# Patient Record
Sex: MALE | Race: WHITE | NOT HISPANIC OR LATINO | URBAN - METROPOLITAN AREA
[De-identification: names, ages, dates, MRNs, and addresses within clinical notes are randomized per-mention and may not be internally consistent; named-entity substitution may affect disease eponyms.]

---
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
## 2018-06-11 ENCOUNTER — EMERGENCY (EMERGENCY)
Facility: HOSPITAL | Age: 18
LOS: 1 days | Discharge: HOME | End: 2018-06-11
Attending: EMERGENCY MEDICINE | Admitting: EMERGENCY MEDICINE
Payer: COMMERCIAL

## 2018-06-11 VITALS
HEART RATE: 68 BPM | SYSTOLIC BLOOD PRESSURE: 127 MMHG | DIASTOLIC BLOOD PRESSURE: 73 MMHG | RESPIRATION RATE: 16 BRPM | TEMPERATURE: 98 F | OXYGEN SATURATION: 100 %

## 2018-06-11 PROCEDURE — 99283 EMERGENCY DEPT VISIT LOW MDM: CPT

## 2018-06-11 RX ORDER — DOLUTEGRAVIR SODIUM 25 MG/1
50 TABLET, FILM COATED ORAL ONCE
Qty: 0 | Refills: 0 | Status: COMPLETED | OUTPATIENT
Start: 2018-06-11 | End: 2018-06-11

## 2018-06-11 RX ORDER — EMTRICITABINE AND TENOFOVIR DISOPROXIL FUMARATE 200; 300 MG/1; MG/1
1 TABLET, FILM COATED ORAL
Qty: 30 | Refills: 0 | OUTPATIENT
Start: 2018-06-11 | End: 2018-07-10

## 2018-06-11 RX ORDER — ONDANSETRON 8 MG/1
1 TABLET, FILM COATED ORAL
Qty: 15 | Refills: 0 | OUTPATIENT
Start: 2018-06-11 | End: 2018-06-15

## 2018-06-11 RX ORDER — DOLUTEGRAVIR SODIUM 25 MG/1
1 TABLET, FILM COATED ORAL
Qty: 30 | Refills: 0 | OUTPATIENT
Start: 2018-06-11 | End: 2018-07-10

## 2018-06-11 RX ORDER — EMTRICITABINE AND TENOFOVIR DISOPROXIL FUMARATE 200; 300 MG/1; MG/1
1 TABLET, FILM COATED ORAL ONCE
Qty: 0 | Refills: 0 | Status: COMPLETED | OUTPATIENT
Start: 2018-06-11 | End: 2018-06-11

## 2018-06-11 RX ADMIN — EMTRICITABINE AND TENOFOVIR DISOPROXIL FUMARATE 1 TABLET(S): 200; 300 TABLET, FILM COATED ORAL at 20:20

## 2018-06-11 RX ADMIN — DOLUTEGRAVIR SODIUM 50 MILLIGRAM(S): 25 TABLET, FILM COATED ORAL at 20:20

## 2018-06-11 NOTE — ED PROVIDER NOTE - PROGRESS NOTE DETAILS
ED Attending Dr. Gómez: gave pt first dose of currently-recommended PEP, sent remaining Rx to pharmacy, with Rx Zofran in case nausea develops; discussed importance of finishing course of PEP and discussed safe sex with pt; advised pt to follow up with PMD within 10 days to consider baseline labs (i.e. LFTs) due to pt declining labs in ED

## 2018-06-11 NOTE — ED PROVIDER NOTE - OBJECTIVE STATEMENT
18y M presents for prescription for PrEP and PEP today. Pt states he was [place] and partook in unprotected sex. States he ejaculated and had sexual contacts with pt's mouth as well. Currently taking concerta. No penile discharge or dysuria. Went to get tested for HIV in the office of health in [place] today with negative results. No allergies 18y M with ADHD (on Concerta) presents requesting prescription for PEP today. Pt states he was at a bar 2 nights ago and partook in unprotected insertive anal sex with another male. States he ejaculated and had sexual contacts with pt's mouth as well. No penile discharge or dysuria. No acute complaints at this time.  This afternoon pt went to a health clinic and was tested for HIV, reports negative result, but was not prescribed PEP and was referred to ED for Rx.  In ED pt declines any blood work and only wants Rx.

## 2018-06-11 NOTE — ED PROVIDER NOTE - MEDICAL DECISION MAKING DETAILS
18y M in ED approximately 48 hours after unprotected insertive anal intercourse with unknown partner. Requested PEP. Pt reports negative HIV testing today at outpt clinic that did not give him PEP. No acute findings on exam. Pt declines testing for gonorrhea or chlamydia in ED. Will give first dose of PEP in ED and send remaining prescription to pharmacy. Recommend followup with PMD in 10 days 18y M in ED approximately 48 hours after unprotected insertive anal intercourse with unknown partner. Requested PEP. Pt reports negative HIV testing today at outpt clinic that did not give him PEP. No acute findings on exam. Pt declines testing for gonorrhea or chlamydia or blood work in ED. Will give first dose of PEP in ED and send remaining prescription to pharmacy. Recommend followup with PMD in 10 days

## 2018-07-31 ENCOUNTER — LABORATORY RESULT (OUTPATIENT)
Age: 18
End: 2018-07-31

## 2018-07-31 ENCOUNTER — APPOINTMENT (OUTPATIENT)
Dept: PEDIATRICS | Facility: CLINIC | Age: 18
End: 2018-07-31
Payer: COMMERCIAL

## 2018-07-31 VITALS — WEIGHT: 112.6 LBS | TEMPERATURE: 98.6 F

## 2018-07-31 DIAGNOSIS — Z87.09 PERSONAL HISTORY OF OTHER DISEASES OF THE RESPIRATORY SYSTEM: ICD-10-CM

## 2018-07-31 PROBLEM — Z00.00 ENCOUNTER FOR PREVENTIVE HEALTH EXAMINATION: Status: ACTIVE | Noted: 2018-07-31

## 2018-07-31 PROCEDURE — 99214 OFFICE O/P EST MOD 30 MIN: CPT

## 2018-07-31 PROCEDURE — 87880 STREP A ASSAY W/OPTIC: CPT | Mod: QW

## 2018-07-31 RX ORDER — METHYLPHENIDATE HYDROCHLORIDE 27 MG/1
27 TABLET, EXTENDED RELEASE ORAL
Refills: 0 | Status: ACTIVE | COMMUNITY
Start: 2018-05-07

## 2018-07-31 RX ORDER — EMTRICITABINE AND TENOFOVIR DISOPROXIL FUMARATE 200; 300 MG/1; MG/1
200-300 TABLET, FILM COATED ORAL
Qty: 30 | Refills: 0 | Status: DISCONTINUED | COMMUNITY
Start: 2018-06-11

## 2018-07-31 RX ORDER — DOLUTEGRAVIR SODIUM 50 MG/1
50 TABLET, FILM COATED ORAL
Qty: 30 | Refills: 0 | Status: DISCONTINUED | COMMUNITY
Start: 2018-06-13

## 2018-07-31 RX ORDER — ONDANSETRON 4 MG/1
4 TABLET, ORALLY DISINTEGRATING ORAL
Qty: 15 | Refills: 0 | Status: DISCONTINUED | COMMUNITY
Start: 2018-06-11

## 2018-07-31 RX ORDER — DIPHENHYDRAMINE HYDROCHLORIDE AND LIDOCAINE HYDROCHLORIDE AND ALUMINUM HYDROXIDE AND MAGNESIUM HYDRO
KIT
Qty: 1 | Refills: 0 | Status: ACTIVE | COMMUNITY
Start: 2018-07-31 | End: 1900-01-01

## 2018-07-31 NOTE — HISTORY OF PRESENT ILLNESS
[FreeTextEntry6] : fever started 3 nights ago ST, congested\par drinking and    urinating well\par decreased appetite, tiredness\par \par denies v,d stomach pain\par \par no one else sick at home\par \par pt has a male partner\par recently seen at an urgent care center for unprotected sex- Labs neg for STD\par pt was started on a prophylactic prep at that time

## 2018-07-31 NOTE — DISCUSSION/SUMMARY
[FreeTextEntry1] : RS- Neg, TC SENT OUT WILL TREAT IF POSITIVE\par ADVISED SX TX, FLUIDS FEVER CONTROL\par F/U IF FEVER PERSIST OR SX WORSEN\par  advised hydration, First Mouthwash BLM q 2-4 hr prn rinse and spit\par advil, ice pops, ensure,  to make [facility] appt

## 2018-07-31 NOTE — PHYSICAL EXAM
[Ulcerative Lesions] : ulcerative lesions [Tender cervical lymph nodes] : tender cervical lymph nodes  [NL] : soft, non tender, non distended, normal bowel sounds, no hepatosplenomegaly [de-identified] : vesicles soft palate and underside of tongue

## 2019-01-22 ENCOUNTER — RECORD ABSTRACTING (OUTPATIENT)
Age: 19
End: 2019-01-22

## 2019-06-12 ENCOUNTER — RECORD ABSTRACTING (OUTPATIENT)
Age: 19
End: 2019-06-12

## 2020-12-16 PROBLEM — Z87.09 HISTORY OF ACUTE PHARYNGITIS: Status: RESOLVED | Noted: 2018-07-31 | Resolved: 2020-12-16
